# Patient Record
Sex: FEMALE
[De-identification: names, ages, dates, MRNs, and addresses within clinical notes are randomized per-mention and may not be internally consistent; named-entity substitution may affect disease eponyms.]

---

## 2024-07-05 PROBLEM — B97.7 HIGH RISK HPV INFECTION: Status: ACTIVE | Noted: 2024-07-05

## 2024-07-05 PROBLEM — R87.613 HGSIL (HIGH GRADE SQUAMOUS INTRAEPITHELIAL LESION) ON PAP SMEAR OF CERVIX: Status: ACTIVE | Noted: 2024-07-05

## 2024-07-05 PROBLEM — N87.9 CERVICAL DYSPLASIA: Status: ACTIVE | Noted: 2024-07-05

## 2024-07-17 ENCOUNTER — APPOINTMENT (OUTPATIENT)
Dept: GYNECOLOGIC ONCOLOGY | Facility: CLINIC | Age: 54
End: 2024-07-17

## 2024-07-17 DIAGNOSIS — R87.613 HIGH GRADE SQUAMOUS INTRAEPITHELIAL LESION ON CYTOLOGIC SMEAR OF CERVIX (HGSIL): ICD-10-CM

## 2024-07-17 DIAGNOSIS — N87.9 DYSPLASIA OF CERVIX UTERI, UNSPECIFIED: ICD-10-CM

## 2024-07-17 DIAGNOSIS — B97.7 PAPILLOMAVIRUS AS THE CAUSE OF DISEASES CLASSIFIED ELSEWHERE: ICD-10-CM

## 2024-08-12 ENCOUNTER — NON-APPOINTMENT (OUTPATIENT)
Age: 54
End: 2024-08-12

## 2024-08-13 ENCOUNTER — APPOINTMENT (OUTPATIENT)
Dept: GYNECOLOGIC ONCOLOGY | Facility: CLINIC | Age: 54
End: 2024-08-13

## 2024-08-13 VITALS
HEIGHT: 62 IN | HEART RATE: 98 BPM | TEMPERATURE: 97.4 F | WEIGHT: 165 LBS | DIASTOLIC BLOOD PRESSURE: 99 MMHG | OXYGEN SATURATION: 83 % | SYSTOLIC BLOOD PRESSURE: 166 MMHG | RESPIRATION RATE: 17 BRPM | BODY MASS INDEX: 30.36 KG/M2

## 2024-08-13 DIAGNOSIS — Z80.8 FAMILY HISTORY OF MALIGNANT NEOPLASM OF OTHER ORGANS OR SYSTEMS: ICD-10-CM

## 2024-08-13 DIAGNOSIS — M06.9 RHEUMATOID ARTHRITIS, UNSPECIFIED: ICD-10-CM

## 2024-08-13 DIAGNOSIS — E03.9 HYPOTHYROIDISM, UNSPECIFIED: ICD-10-CM

## 2024-08-13 DIAGNOSIS — R87.613 HIGH GRADE SQUAMOUS INTRAEPITHELIAL LESION ON CYTOLOGIC SMEAR OF CERVIX (HGSIL): ICD-10-CM

## 2024-08-13 DIAGNOSIS — N87.9 DYSPLASIA OF CERVIX UTERI, UNSPECIFIED: ICD-10-CM

## 2024-08-13 PROCEDURE — 99203 OFFICE O/P NEW LOW 30 MIN: CPT | Mod: 25

## 2024-08-13 PROCEDURE — 57456 ENDOCERV CURETTAGE W/SCOPE: CPT

## 2024-08-13 PROCEDURE — 99459 PELVIC EXAMINATION: CPT

## 2024-08-13 RX ORDER — LINACLOTIDE 72 UG/1
72 CAPSULE, GELATIN COATED ORAL
Refills: 0 | Status: ACTIVE | COMMUNITY

## 2024-08-13 RX ORDER — METHOTREXATE SODIUM 25 MG/ML
25 INJECTION, SOLUTION INTRA-ARTERIAL; INTRAMUSCULAR; INTRAVENOUS
Refills: 0 | Status: ACTIVE | COMMUNITY

## 2024-08-13 RX ORDER — METHOTREXATE 2.5 MG/1
TABLET ORAL
Refills: 0 | Status: ACTIVE | COMMUNITY

## 2024-08-13 RX ORDER — CALCIUM CARBONATE 500(1250)
TABLET ORAL
Refills: 0 | Status: ACTIVE | COMMUNITY

## 2024-08-13 RX ORDER — CHROMIUM 200 MCG
TABLET ORAL
Refills: 0 | Status: ACTIVE | COMMUNITY

## 2024-08-13 RX ORDER — HYDROXYCHLOROQUINE SULFATE 200 MG/1
200 TABLET, FILM COATED ORAL
Refills: 0 | Status: ACTIVE | COMMUNITY

## 2024-08-13 NOTE — DISCUSSION/SUMMARY
[Reviewed Clinical Lab Test(s)] : Results of clinical tests were reviewed. [Reviewed Radiology Report(s)] : Radiology reports were reviewed. [Discuss Alternatives/Risks/Benefits w/Patient] : All alternatives, risks, and benefits were discussed with the patient/family and all questions were answered.  Patient expressed good understanding and appreciates the importance of follow up as recommended. [FreeTextEntry1] : I sat down with Boy and her daughter, Jacy, and reviewed the diagnosis and management of high grade cervical dysplasia. Colposcopy today showed no significant lesions of the vagina or exocervix. An ECC was obtained. In light of the HGSIL pap I would recommend proceeding with a cone biopsy in the ambulatory surgery unit. I explained that a cone biopsy has both diagnostic and therapeutic benefits. She understands that there is a small risk of finding an invasive cervical cancer on cone biopsy and that further surgery would be required in this circumstance. We reviewed the risks and benefits of the procedure. We discussed the role of persistent HPV infection in the generation of lower genital tract dysplasia.   Christina and Jacy had the opportunity to ask questions and expressed understanding of the information presented. She would like to proceed with a CKC and will be scheduled in the near future.

## 2024-08-13 NOTE — OB HISTORY
[Total Preg ___] : : [unfilled] [Full Term ___] : [unfilled] (full-term) [Premature ___] : [unfilled] (premature) [Living ___] : [unfilled] (living) [Vaginal ___] : [unfilled] vaginal delivery(s)

## 2024-08-13 NOTE — HISTORY OF PRESENT ILLNESS
[FreeTextEntry1] : Referred by Dr. Kathi Damico  PCP Dr. Christen Donnelly  Ms. Christina Paz  is a 54-year-old  post-menopausal female, referred by Dr. Kathi Damico for further evaluation and management of recurrent cervical dysplasia, history CIN3, s/p office LEEP with negative margins ut positive ECC .   She denies VB, VD or pelvic pain. She denies change in bowel and urinary habits.  She denies nausea/vomiting. She is here to discuss further management.  2024 HGSIL HPV+  2023 LEEP:   ECC- CIN1 Anterior cervix bx - benign Posterior cervix bx - CIN3, margins negative for dysplasia    2023 HGSIL, HPV+   2023 EMBX - proliferative endometrium, features of a polyp  2024 TVUS Uterus: 8.7 x 5.8 x 7.1 cm, fibroid, adenomyosis  Endometrium: 3.4 mm RTO: suboptimal imaging due to bowel, no adnexal masses/cyst seen LTO: 0.8 x 1.4 x 0.9 cm, no adnexal mass seen  No free fluid  PMH: RA, hypothyroid PSH: LEEP, open appy  Family history cancer: paternal uncle (testicular? prostate, unknown), Brain (paternal cousin)   Mammo  per patient WNL  Colonoscopy  normal per patient, follow up per GI

## 2024-08-13 NOTE — PHYSICAL EXAM
[Chaperone Present] : A chaperone was present in the examining room during all aspects of the physical examination [38617] : A chaperone was present during the pelvic exam. [FreeTextEntry2] : Daya [Normal] : Anus and perineum: Normal sphincter tone, no masses, no prolapse. [Fully active, able to carry on all pre-disease performance without restriction] : Status 0 - Fully active, able to carry on all pre-disease performance without restriction

## 2024-08-13 NOTE — PROCEDURE
[Colposcopy] : colposcopy [HGSIL] : HGSIL [Patient] : the patient [Verbal Consent] : verbal consent was obtained prior to the procedure and is detailed in the patient's record [Site Verification] : site verification performed. [Time Out] : Time Out Procedure:The following was confirmed prior to the procedure-Correct patient identity, correct site, agreement on the procedure to be done and correct patient position. [Allergies Reviewed] : Allergies reviewed [SCJ Fully Visualized] : the squamocolumnar junction was fully visualized [No Abnormalities] : no abnormalities seen [Biopsies Taken: # ___] : no biopsies were taken of the cervix [ECC Done] : an Endocervical curettage was performed.  [No Complications] : none [Tolerated Well] : the patient tolerated the procedure well [Post procedure instructions and information given] : post procedure instructions and information given and reviewed with patient.

## 2024-08-19 ENCOUNTER — OUTPATIENT (OUTPATIENT)
Dept: OUTPATIENT SERVICES | Facility: HOSPITAL | Age: 54
LOS: 1 days | End: 2024-08-19

## 2024-08-19 VITALS
DIASTOLIC BLOOD PRESSURE: 87 MMHG | RESPIRATION RATE: 20 BRPM | TEMPERATURE: 98 F | OXYGEN SATURATION: 99 % | HEART RATE: 72 BPM | SYSTOLIC BLOOD PRESSURE: 150 MMHG | HEIGHT: 59.5 IN | WEIGHT: 164.02 LBS

## 2024-08-19 DIAGNOSIS — R87.613 HIGH GRADE SQUAMOUS INTRAEPITHELIAL LESION ON CYTOLOGIC SMEAR OF CERVIX (HGSIL): ICD-10-CM

## 2024-08-19 DIAGNOSIS — Z90.49 ACQUIRED ABSENCE OF OTHER SPECIFIED PARTS OF DIGESTIVE TRACT: Chronic | ICD-10-CM

## 2024-08-19 RX ORDER — FOLIC ACID 1 MG
1 TABLET ORAL
Refills: 0 | DISCHARGE

## 2024-08-19 RX ORDER — METHOTREXATE SODIUM 2.5 MG
25 TABLET ORAL
Refills: 0 | DISCHARGE

## 2024-08-19 RX ORDER — OMEPRAZOLE 40 MG/1
1 CAPSULE, DELAYED RELEASE ORAL
Refills: 0 | DISCHARGE

## 2024-08-19 RX ORDER — CYANOCOBALAMIN (VITAMIN B-12) 500MCG/0.1
1000 GEL (ML) NASAL
Refills: 0 | DISCHARGE

## 2024-08-19 RX ORDER — LEVOTHYROXINE SODIUM 100 MCG
1 TABLET ORAL
Refills: 0 | DISCHARGE

## 2024-08-19 RX ORDER — HYDROXYCHLOROQUINE SULFATE 200 MG/1
1 TABLET, FILM COATED ORAL
Refills: 0 | DISCHARGE

## 2024-08-19 NOTE — H&P PST ADULT - PROBLEM SELECTOR PLAN 1
Hpi Title: Evaluation of Skin Lesions
How Severe Are Your Spot(S)?: mild
Have Your Spot(S) Been Treated In The Past?: has not been treated
Additional History: \\n\\nSpot on mid upper nosebridge
Scheduled for Cold Knife Cone Biopsy of Cervix on 08/26/24.  Preop instructions provided and patient verbalizes understanding.  Patient instructed to take Levothyroxine and Omeprazole on the morning of procedure.

## 2024-08-19 NOTE — H&P PST ADULT - HISTORY OF PRESENT ILLNESS
54 yr old female with medical hx hyothyroidism, RA and GERD presents for preop evaluation with dx of High Grade Squamous Intraepithelial Lesion on Cytology Smear of Cervix.  Patient went for routine GYN s/p pap as routine follow-up which revealed dysplasia.  Patient with history CIN3, s/p office LEEP with negative margins ut positive ECC 2023.  Patient was sent for sonogram : 5/31/2024 TVUS  Uterus: 8.7 x 5.8 x 7.1 cm, fibroid, adenomyosis  Endometrium: 3.4 mm  RTO: suboptimal imaging due to bowel, no adnexal masses/cyst seen  LTO: 0.8 x 1.4 x 0.9 cm, no adnexal mass seen  No free fluid   Patient is now scheduled for Cold Knife Cone Biopsy of Cervix on 08/26/24.  ?

## 2024-08-19 NOTE — H&P PST ADULT - NSICDXPASTMEDICALHX_GEN_ALL_CORE_FT
PAST MEDICAL HISTORY:  GERD (gastroesophageal reflux disease)     High grade squamous intraepithelial lesion of cervix     Hypothyroidism     Rheumatoid arthritis

## 2024-08-19 NOTE — H&P PST ADULT - NSICDXFAMILYHX_GEN_ALL_CORE_FT
FAMILY HISTORY:  Mother  Still living? Yes, Estimated age: 81-90  FHx: diabetes mellitus, Age at diagnosis: Age Unknown

## 2024-08-20 ENCOUNTER — RESULT REVIEW (OUTPATIENT)
Age: 54
End: 2024-08-20

## 2024-08-20 ENCOUNTER — OUTPATIENT (OUTPATIENT)
Dept: OUTPATIENT SERVICES | Facility: HOSPITAL | Age: 54
LOS: 1 days | End: 2024-08-20
Payer: MEDICAID

## 2024-08-20 DIAGNOSIS — R87.613 HIGH GRADE SQUAMOUS INTRAEPITHELIAL LESION ON CYTOLOGIC SMEAR OF CERVIX (HGSIL): ICD-10-CM

## 2024-08-20 DIAGNOSIS — Z90.49 ACQUIRED ABSENCE OF OTHER SPECIFIED PARTS OF DIGESTIVE TRACT: Chronic | ICD-10-CM

## 2024-08-20 PROCEDURE — 88141 CYTOPATH C/V INTERPRET: CPT | Mod: 59

## 2024-08-20 PROCEDURE — 88321 CONSLTJ&REPRT SLD PREP ELSWR: CPT

## 2024-08-21 ENCOUNTER — RESULT REVIEW (OUTPATIENT)
Age: 54
End: 2024-08-21

## 2024-08-21 LAB — CYTOLOGY SPEC DOC CYTO: SIGNIFICANT CHANGE UP

## 2024-08-21 PROCEDURE — 88321 CONSLTJ&REPRT SLD PREP ELSWR: CPT

## 2024-08-22 LAB — SURGICAL PATHOLOGY STUDY: SIGNIFICANT CHANGE UP

## 2024-08-23 NOTE — ASU PATIENT PROFILE, ADULT - FALL HARM RISK - UNIVERSAL INTERVENTIONS
Bed in lowest position, wheels locked, appropriate side rails in place/Call bell, personal items and telephone in reach/Instruct patient to call for assistance before getting out of bed or chair/Non-slip footwear when patient is out of bed/[place] to call system/Physically safe environment - no spills, clutter or unnecessary equipment/Purposeful Proactive Rounding/Room/bathroom lighting operational, light cord in reach

## 2024-08-25 ENCOUNTER — TRANSCRIPTION ENCOUNTER (OUTPATIENT)
Age: 54
End: 2024-08-25

## 2024-08-26 ENCOUNTER — TRANSCRIPTION ENCOUNTER (OUTPATIENT)
Age: 54
End: 2024-08-26

## 2024-08-26 ENCOUNTER — APPOINTMENT (OUTPATIENT)
Dept: GYNECOLOGIC ONCOLOGY | Facility: HOSPITAL | Age: 54
End: 2024-08-26

## 2024-08-26 ENCOUNTER — OUTPATIENT (OUTPATIENT)
Dept: OUTPATIENT SERVICES | Facility: HOSPITAL | Age: 54
LOS: 1 days | Discharge: HOME | End: 2024-08-26
Payer: COMMERCIAL

## 2024-08-26 VITALS
TEMPERATURE: 98 F | SYSTOLIC BLOOD PRESSURE: 148 MMHG | RESPIRATION RATE: 15 BRPM | WEIGHT: 164.02 LBS | OXYGEN SATURATION: 100 % | DIASTOLIC BLOOD PRESSURE: 67 MMHG | HEIGHT: 59.5 IN | HEART RATE: 84 BPM

## 2024-08-26 VITALS — OXYGEN SATURATION: 98 % | HEART RATE: 71 BPM | RESPIRATION RATE: 16 BRPM

## 2024-08-26 DIAGNOSIS — R87.613 HIGH GRADE SQUAMOUS INTRAEPITHELIAL LESION ON CYTOLOGIC SMEAR OF CERVIX (HGSIL): ICD-10-CM

## 2024-08-26 DIAGNOSIS — Z90.49 ACQUIRED ABSENCE OF OTHER SPECIFIED PARTS OF DIGESTIVE TRACT: Chronic | ICD-10-CM

## 2024-08-26 LAB — HCG UR QL: NEGATIVE — SIGNIFICANT CHANGE UP

## 2024-08-26 PROCEDURE — 57520 CONIZATION OF CERVIX: CPT

## 2024-08-26 PROCEDURE — 88342 IMHCHEM/IMCYTCHM 1ST ANTB: CPT | Mod: 26

## 2024-08-26 PROCEDURE — 88305 TISSUE EXAM BY PATHOLOGIST: CPT | Mod: 26

## 2024-08-26 PROCEDURE — 88307 TISSUE EXAM BY PATHOLOGIST: CPT | Mod: 26

## 2024-08-26 RX ORDER — HYDROMORPHONE HYDROCHLORIDE 2 MG/1
1 TABLET ORAL
Refills: 0 | Status: DISCONTINUED | OUTPATIENT
Start: 2024-08-26 | End: 2024-08-26

## 2024-08-26 RX ORDER — ONDANSETRON 2 MG/ML
4 INJECTION, SOLUTION INTRAMUSCULAR; INTRAVENOUS ONCE
Refills: 0 | Status: DISCONTINUED | OUTPATIENT
Start: 2024-08-26 | End: 2024-09-09

## 2024-08-26 RX ORDER — HYDROMORPHONE HYDROCHLORIDE 2 MG/1
0.5 TABLET ORAL
Refills: 0 | Status: DISCONTINUED | OUTPATIENT
Start: 2024-08-26 | End: 2024-08-26

## 2024-08-26 RX ADMIN — HYDROMORPHONE HYDROCHLORIDE 0.5 MILLIGRAM(S): 2 TABLET ORAL at 09:32

## 2024-08-26 RX ADMIN — Medication 125 MILLILITER(S): at 09:18

## 2024-08-26 RX ADMIN — HYDROMORPHONE HYDROCHLORIDE 0.5 MILLIGRAM(S): 2 TABLET ORAL at 09:18

## 2024-08-26 NOTE — ASU DISCHARGE PLAN (ADULT/PEDIATRIC) - NURSING INSTRUCTIONS
You received IV Tylenol for pain management at _08:00 AM__. Please DO NOT take any Tylenol (Acetaminophen) containing products, such as Vicodin, Percocet, Excedrin, and cold medications for the next 6 hours (until _02:00__ PM). DO NOT TAKE MORE THAN 3000 MG OF TYLENOL in a 24 hour period.   You received IV Toradol for pain management at _08:20 AM__. Please DO NOT take Motrin/Ibuprofen/Advil/Aleve/NSAIDs (Non-Steroidal Anti-Inflammatory Drugs) for the next 6 hours (until  02:20_ PM).   Nothing in vagina, no intercourse, no douching, no tampons, no tub baths, and no swimming for 2 weeks or until cleared by M.D.

## 2024-08-26 NOTE — ASU DISCHARGE PLAN (ADULT/PEDIATRIC) - CARE PROVIDER_API CALL
Caryn Benjamin  Gynecologic Oncology  [address] [place] [id number]  Phone: [phone]  Fax: [phone]  Follow Up Time:

## 2024-08-26 NOTE — ASU DISCHARGE PLAN (ADULT/PEDIATRIC) - ASU DC SPECIAL INSTRUCTIONSFT
Postoperative Instructions      Pain control     For pain control, take the followin. Motrin 600mg four times a day (every 6 hours), take with food  2. Add Tylenol 975mg four times a day (every 6 hours), alternated with motrin  Motrin and Tylenol can be obtained over the counter.    Postoperative restrictions   Do not drive or make important decisions for 24 hours after anesthesia. You may feel drowsy for 24 hours and should have a responsible adult with you during that time. Nothing in the vagina (tampons, sexual intercourse), No tub baths, pools or hot tubs for 4 weeks (showers are ok!).       Vaginal bleeding   Spotting per vagina is normal in first few days after surgery. If you are soaking 1 pad per hour, that is not normal and you should notify your doctor's office and seek medical attention right away.        Signs of Infection  Some postoperative pain and discomfort is normal. However, if you experience any of the following, you may be developing an infection and should be seen by your doctor: pain that does not get better with the oral medications listed above, fever greater than 100.4F, foul smelling discharge coming from the surgical site, nausea and vomiting that does not stop (especially if you are unable to tolerate oral intake), or inability to urinate. If you experience any of these symptoms, call your doctor's office to be seen right away.    Follow Up  Call your doctor's office to schedule a postoperative appointment in 2 weeks. The results from the procedure will be discussed with you at that time.

## 2024-08-26 NOTE — ASU DISCHARGE PLAN (ADULT/PEDIATRIC) - PHYSICIAN SECTION COMPLETE
[de-identified] : proximal biceps tendonitis\par \par Will begin pt and follow up in 6 weeks. if symptoms persist we will consider injection. \par \par "Written by Sylvester Spangler, acting as Scribe for Paulino Fleming M.D" \par \par Home Exercise \par \par  The patient is instructed on a home exercise program. \par  \par RICE \par I explained to the patient that rest, ice, compression, and elevation would benefit them.  They may return to activity after follow-up or when they no longer have any pain. \par  \par Pain Guide Activities \par The patient was advised to let pain guide the gradual advancement of activities. \par  \par Activity Modification \par The patient was advised to modify their activities. \par  \par Dx / Natural History \par The patient was advised of the diagnosis.  The natural history of the pathology was explained in full to the patient in layman's terms.  Several different treatment options were discussed and explained in full to the patient including the risks and benefits of both surgical and non-surgical treatments.  All questions and concerns were answered.\par  Yes

## 2024-08-26 NOTE — BRIEF OPERATIVE NOTE - NSICDXBRIEFPROCEDURE_GEN_ALL_CORE_FT
PROCEDURES:  Exam under anesthesia, pelvic 26-Aug-2024 08:44:46  Isabella Correa  Biopsy, cold knife cone 26-Aug-2024 08:44:53  Isabella Correa

## 2024-08-26 NOTE — CHART NOTE - NSCHARTNOTEFT_GEN_A_CORE
Gynecological Oncology PA Post Op Note    Patient seen and examined at bedside with daughter at bedside, recently post-procedure. No acute complaints at this time. Resting with good pain control. Denies headache, dizziness,  nausea, vomiting, chest pain, palpitations and shortness of breath.   Patient tolerating clears.      Vital Signs Last 24 Hours  T(C): 36.6 (08-26-24 @ 10:30), Max: 36.8 (08-26-24 @ 05:58)  HR: 71 (08-26-24 @ 10:35) (69 - 86)  BP: 127/80 (08-26-24 @ 10:30) (116/86 - 148/67)  RR: 16 (08-26-24 @ 10:35) (12 - 20)  SpO2: 98% (08-26-24 @ 10:35) (96% - 100%)    I&O's Summary    26 Aug 2024 07:01  -  26 Aug 2024 11:40  --------------------------------------------------------  IN: 730 mL / OUT: 400 mL / NET: 330 mL        Physical Exam:  General: NAD  Pulmonary: bilaterally clear to ascultation, non labored respirations  CV: Regular rate and rhythm  Abdomen: soft, non-distended, non-tender  : Mild bleeding on pad  Ext: No lower extremity edema or calf tenderness bilaterally. Bilateral venodynes in place.    Labs:      MEDICATIONS  (STANDING):  lactated ringers. 1000 milliLiter(s) (125 mL/Hr) IV Continuous <Continuous>    MEDICATIONS  (PRN):  HYDROmorphone  Injectable 0.5 milliGRAM(s) IV Push every 10 minutes PRN Moderate Pain (4 - 6)  HYDROmorphone  Injectable 1 milliGRAM(s) IV Push every 10 minutes PRN Severe Pain (7 - 10)  ondansetron Injectable 4 milliGRAM(s) IV Push once PRN Nausea and/or Vomiting      Assessment and Plan    54y yo s/p exam under anesthesia , cold knife cone biopsy for QUYNH III (cervical intraepithelial neoplasia III) and HGSIL on Pap smear of cervix  recovering well in acute post-operative state. See operative note for details. EBL: 5      - LR @125ml  -Regular diet  -Pain management PRN   -Discharge home once ambulating and voiding without difficulty, tolerating PO diet.   - F/u with Dr. Benjamin in 2 weeks.  - Discharge instructions reviewed with patient.          Kristen Mas PA-C  #[id number]

## 2024-08-26 NOTE — BRIEF OPERATIVE NOTE - NSICDXBRIEFPOSTOP_GEN_ALL_CORE_FT
POST-OP DIAGNOSIS:  QUYNH III (cervical intraepithelial neoplasia III) 26-Aug-2024 08:46:02  Isabella Correa  HGSIL on Pap smear of cervix 26-Aug-2024 08:46:09  Isabella Correa

## 2024-08-26 NOTE — BRIEF OPERATIVE NOTE - NSICDXBRIEFPREOP_GEN_ALL_CORE_FT
PRE-OP DIAGNOSIS:  QUYNH III (cervical intraepithelial neoplasia III) 26-Aug-2024 08:45:57  Isabella Correa  HGSIL on Pap smear of cervix 26-Aug-2024 08:45:27  Isabella Correa

## 2024-08-26 NOTE — ASU DISCHARGE PLAN (ADULT/PEDIATRIC) - NS MD DC FALL RISK RISK
For information on Fall & Injury Prevention, visit: [email or web address] OR  [email or web address] OR  https://www.cdc.gov/steadi/patient.html

## 2024-08-26 NOTE — BRIEF OPERATIVE NOTE - OPERATION/FINDINGS
Exam under anesthesia reveals normal external female genitalia. Cervix palpable on bimanual exam. Anteverted, small uterus.   Cervix visualized and with less cervical stroma anteriorly, more flush against anterior vagina than posteriorly. Cervical os without stenosis.

## 2024-08-30 ENCOUNTER — NON-APPOINTMENT (OUTPATIENT)
Age: 54
End: 2024-08-30

## 2024-09-04 PROBLEM — E03.9 HYPOTHYROIDISM, UNSPECIFIED: Chronic | Status: ACTIVE | Noted: 2024-08-19

## 2024-09-04 PROBLEM — M06.9 RHEUMATOID ARTHRITIS, UNSPECIFIED: Chronic | Status: ACTIVE | Noted: 2024-08-19

## 2024-09-04 PROBLEM — K21.9 GASTRO-ESOPHAGEAL REFLUX DISEASE WITHOUT ESOPHAGITIS: Chronic | Status: ACTIVE | Noted: 2024-08-19

## 2024-09-04 PROBLEM — R87.613 HIGH GRADE SQUAMOUS INTRAEPITHELIAL LESION ON CYTOLOGIC SMEAR OF CERVIX (HGSIL): Chronic | Status: ACTIVE | Noted: 2024-08-19

## 2024-09-08 LAB — SURGICAL PATHOLOGY STUDY: SIGNIFICANT CHANGE UP

## 2024-09-11 ENCOUNTER — APPOINTMENT (OUTPATIENT)
Dept: GYNECOLOGIC ONCOLOGY | Facility: CLINIC | Age: 54
End: 2024-09-11
Payer: COMMERCIAL

## 2024-09-11 VITALS
SYSTOLIC BLOOD PRESSURE: 157 MMHG | BODY MASS INDEX: 30.36 KG/M2 | HEART RATE: 76 BPM | WEIGHT: 165 LBS | HEIGHT: 62 IN | OXYGEN SATURATION: 99 % | TEMPERATURE: 98 F | DIASTOLIC BLOOD PRESSURE: 93 MMHG | RESPIRATION RATE: 17 BRPM

## 2024-09-11 DIAGNOSIS — N87.9 DYSPLASIA OF CERVIX UTERI, UNSPECIFIED: ICD-10-CM

## 2024-09-11 PROCEDURE — 99024 POSTOP FOLLOW-UP VISIT: CPT

## 2024-09-11 NOTE — PLAN
[TextEntry] : QUYNH 3 - complete excision with negative margins and ECC.  I have recommended close observation.  Will return in 6 months for co-testing.  Discussed with patient and daughter.  Importance of long term surveillance reviewed.

## 2024-09-11 NOTE — DISCUSSION/SUMMARY
[Doing Well] : is doing well [Excellent Pain Control] : has excellent pain control [No Sign of Infection] : is showing no signs of infection [Firm] : soft [Tender] : nontender [Cervical Abnormality] : normal cervix [External Genitalia Abnormal] : normal external genitalia [Vaginal Exam Abnormal] : normal vaginal exam [de-identified] : cone site healing well [de-identified] : Path: Small area of QUYNH 3, negative margins, negative ECC.

## 2024-09-11 NOTE — REASON FOR VISIT
[Post Op] : post op visit [de-identified] : 8/26/24 [de-identified] : CKC [de-identified] : Feels well. No bleeding or pain

## 2024-09-11 NOTE — LETTER BODY
[Dear  ___] : Dear  [unfilled], [I recently saw our patient [unfilled] for a follow-up visit.] : I recently saw our patient, [unfilled] for a follow-up visit. [Attached please find my note.] : Attached please find my note. [FreeTextEntry1] : Path 8/26/24

## 2025-04-09 ENCOUNTER — LABORATORY RESULT (OUTPATIENT)
Age: 55
End: 2025-04-09

## 2025-04-09 ENCOUNTER — APPOINTMENT (OUTPATIENT)
Dept: GYNECOLOGIC ONCOLOGY | Facility: CLINIC | Age: 55
End: 2025-04-09
Payer: COMMERCIAL

## 2025-04-09 VITALS
DIASTOLIC BLOOD PRESSURE: 87 MMHG | RESPIRATION RATE: 17 BRPM | HEIGHT: 62 IN | OXYGEN SATURATION: 98 % | WEIGHT: 162 LBS | BODY MASS INDEX: 29.81 KG/M2 | TEMPERATURE: 97.6 F | HEART RATE: 86 BPM | SYSTOLIC BLOOD PRESSURE: 150 MMHG

## 2025-04-09 DIAGNOSIS — N87.9 DYSPLASIA OF CERVIX UTERI, UNSPECIFIED: ICD-10-CM

## 2025-04-09 DIAGNOSIS — R87.613 HIGH GRADE SQUAMOUS INTRAEPITHELIAL LESION ON CYTOLOGIC SMEAR OF CERVIX (HGSIL): ICD-10-CM

## 2025-04-09 PROCEDURE — 99213 OFFICE O/P EST LOW 20 MIN: CPT

## 2025-04-09 PROCEDURE — 99459 PELVIC EXAMINATION: CPT

## 2025-04-16 LAB — CYTOLOGY CVX/VAG DOC THIN PREP: ABNORMAL

## 2025-06-04 ENCOUNTER — APPOINTMENT (OUTPATIENT)
Dept: GYNECOLOGIC ONCOLOGY | Facility: CLINIC | Age: 55
End: 2025-06-04
Payer: COMMERCIAL

## 2025-06-04 ENCOUNTER — NON-APPOINTMENT (OUTPATIENT)
Age: 55
End: 2025-06-04

## 2025-06-04 VITALS
HEIGHT: 62 IN | HEART RATE: 96 BPM | DIASTOLIC BLOOD PRESSURE: 91 MMHG | BODY MASS INDEX: 30.73 KG/M2 | OXYGEN SATURATION: 95 % | TEMPERATURE: 97.3 F | SYSTOLIC BLOOD PRESSURE: 165 MMHG | WEIGHT: 167 LBS

## 2025-06-04 DIAGNOSIS — N87.9 DYSPLASIA OF CERVIX UTERI, UNSPECIFIED: ICD-10-CM

## 2025-06-04 DIAGNOSIS — R87.613 HIGH GRADE SQUAMOUS INTRAEPITHELIAL LESION ON CYTOLOGIC SMEAR OF CERVIX (HGSIL): ICD-10-CM

## 2025-06-04 PROCEDURE — 99212 OFFICE O/P EST SF 10 MIN: CPT | Mod: 25

## 2025-06-04 PROCEDURE — 99459 PELVIC EXAMINATION: CPT

## 2025-06-04 PROCEDURE — 57454 BX/CURETT OF CERVIX W/SCOPE: CPT

## 2025-06-10 LAB — CORE LAB BIOPSY: NORMAL

## 2025-08-26 ENCOUNTER — APPOINTMENT (OUTPATIENT)
Dept: GYNECOLOGIC ONCOLOGY | Facility: CLINIC | Age: 55
End: 2025-08-26
Payer: MEDICAID

## 2025-08-26 VITALS
OXYGEN SATURATION: 98 % | SYSTOLIC BLOOD PRESSURE: 152 MMHG | HEIGHT: 62 IN | TEMPERATURE: 97.8 F | WEIGHT: 172 LBS | DIASTOLIC BLOOD PRESSURE: 92 MMHG | BODY MASS INDEX: 31.65 KG/M2 | HEART RATE: 82 BPM

## 2025-08-26 DIAGNOSIS — B97.7 PAPILLOMAVIRUS AS THE CAUSE OF DISEASES CLASSIFIED ELSEWHERE: ICD-10-CM

## 2025-08-26 DIAGNOSIS — N87.9 DYSPLASIA OF CERVIX UTERI, UNSPECIFIED: ICD-10-CM

## 2025-08-26 PROCEDURE — 99213 OFFICE O/P EST LOW 20 MIN: CPT | Mod: 25

## 2025-08-26 PROCEDURE — 56821 COLPOSCOPY VULVA W/BIOPSY: CPT

## 2025-08-26 PROCEDURE — 99459 PELVIC EXAMINATION: CPT

## 2025-09-19 ENCOUNTER — APPOINTMENT (OUTPATIENT)
Dept: GYNECOLOGIC ONCOLOGY | Facility: HOSPITAL | Age: 55
End: 2025-09-19

## 2025-09-19 ENCOUNTER — RESULT REVIEW (OUTPATIENT)
Age: 55
End: 2025-09-19

## 2025-09-19 ENCOUNTER — TRANSCRIPTION ENCOUNTER (OUTPATIENT)
Age: 55
End: 2025-09-19

## (undated) DEVICE — ELCTR BOVIE PENCIL BLADE 10FT

## (undated) DEVICE — WARMING BLANKET FULL ADULT

## (undated) DEVICE — DRSG PAD SANITARY OB

## (undated) DEVICE — PREP BETADINE KIT

## (undated) DEVICE — PACK PERI GYN

## (undated) DEVICE — DRSG TELFA 3 X 8

## (undated) DEVICE — POSITIONER STRAP ARMBOARD VELCRO TS-30

## (undated) DEVICE — CONNECTOR 5 IN1 SUCTION TUBING

## (undated) DEVICE — MARKING PEN W RULER

## (undated) DEVICE — BLADE SURGICAL #11 CARBON

## (undated) DEVICE — BLADE SURGICAL #15 CARBON

## (undated) DEVICE — GOWN LG

## (undated) DEVICE — SUT SILK 2-0 18" FS

## (undated) DEVICE — GLV 5.5 PROTEXIS (WHITE)

## (undated) DEVICE — TUBING SUCTION NONCONDUCTIVE 6MM X 12FT

## (undated) DEVICE — VISITEC 4X4

## (undated) DEVICE — DRAPE TOWEL BLUE 17" X 24"

## (undated) DEVICE — GLV 6 PROTEXIS (WHITE)

## (undated) DEVICE — ELCTR BOVIE PENCIL SMOKE EVACUATION

## (undated) DEVICE — ELCTR BALL LLETZ LG 5MM

## (undated) DEVICE — VENODYNE/SCD SLEEVE CALF MEDIUM

## (undated) DEVICE — BASIN SET SINGLE

## (undated) DEVICE — SYR LUER LOK 10CC

## (undated) DEVICE — APPLICATOR Q TIP 6" WOOD STEM

## (undated) DEVICE — POSITIONER PINK PAD PIGAZZI SYSTEM

## (undated) DEVICE — NDL SPINAL 22G X 3.5" (BLACK)

## (undated) DEVICE — LABELS BLANK W PEN